# Patient Record
Sex: MALE | Race: BLACK OR AFRICAN AMERICAN
[De-identification: names, ages, dates, MRNs, and addresses within clinical notes are randomized per-mention and may not be internally consistent; named-entity substitution may affect disease eponyms.]

---

## 2022-01-09 ENCOUNTER — HOSPITAL ENCOUNTER (EMERGENCY)
Dept: HOSPITAL 97 - ER | Age: 27
LOS: 1 days | Discharge: HOME | End: 2022-01-10
Payer: SELF-PAY

## 2022-01-09 DIAGNOSIS — Z20.822: ICD-10-CM

## 2022-01-09 DIAGNOSIS — S39.012A: Primary | ICD-10-CM

## 2022-01-09 PROCEDURE — 96361 HYDRATE IV INFUSION ADD-ON: CPT

## 2022-01-09 PROCEDURE — 99284 EMERGENCY DEPT VISIT MOD MDM: CPT

## 2022-01-09 PROCEDURE — 80048 BASIC METABOLIC PNL TOTAL CA: CPT

## 2022-01-09 PROCEDURE — 81003 URINALYSIS AUTO W/O SCOPE: CPT

## 2022-01-09 PROCEDURE — 74177 CT ABD & PELVIS W/CONTRAST: CPT

## 2022-01-09 PROCEDURE — 36415 COLL VENOUS BLD VENIPUNCTURE: CPT

## 2022-01-09 PROCEDURE — 0240U: CPT

## 2022-01-09 PROCEDURE — 96374 THER/PROPH/DIAG INJ IV PUSH: CPT

## 2022-01-09 PROCEDURE — 83690 ASSAY OF LIPASE: CPT

## 2022-01-09 PROCEDURE — 85025 COMPLETE CBC W/AUTO DIFF WBC: CPT

## 2022-01-09 PROCEDURE — 80076 HEPATIC FUNCTION PANEL: CPT

## 2022-01-10 VITALS — OXYGEN SATURATION: 100 % | DIASTOLIC BLOOD PRESSURE: 75 MMHG | SYSTOLIC BLOOD PRESSURE: 124 MMHG | TEMPERATURE: 99.9 F

## 2022-01-10 LAB
ALBUMIN SERPL BCP-MCNC: 3.8 G/DL (ref 3.4–5)
ALP SERPL-CCNC: 79 U/L (ref 45–117)
ALT SERPL W P-5'-P-CCNC: 27 U/L (ref 12–78)
AST SERPL W P-5'-P-CCNC: 15 U/L (ref 15–37)
BUN BLD-MCNC: 16 MG/DL (ref 7–18)
GLUCOSE SERPLBLD-MCNC: 129 MG/DL (ref 74–106)
HCT VFR BLD CALC: 40 % (ref 39.6–49)
LIPASE SERPL-CCNC: 95 U/L (ref 73–393)
LYMPHOCYTES # SPEC AUTO: 1.7 K/UL (ref 0.7–4.9)
PMV BLD: 7.2 FL (ref 7.6–11.3)
POTASSIUM SERPL-SCNC: 3.9 MMOL/L (ref 3.5–5.1)
RBC # BLD: 4.66 M/UL (ref 4.33–5.43)
SARS-COV-2 RNA RESP QL NAA+PROBE: NEGATIVE

## 2022-01-10 NOTE — ER
Nurse's Notes                                                                                     

 [facility]                                                                 

Name: Nick Smyth                                                                                

Age: 26 yrs                                                                                       

Sex: Male                                                                                         

: 1995                                                                                   

MRN: [id number]                                                                                   

Arrival Date: 2022                                                                          

Time: 22:32                                                                                       

Account#: [id number]                                                                            

Bed 10                                                                                            

Private MD:                                                                                       

Diagnosis: Low back pain;Strain of muscle, fascia and tendon of lower back                        

                                                                                                  

Presentation:                                                                                     

                                                                                             

23:28 Chief complaint: Patient states: lower back pain x3 days. denies change in urination.   lg3 

      denies any medications taken at home. reports swelling of the left lower back. pain on      

      laughing or coughing. Coronavirus screen: Client denies travel out of the [place] in the       

      last 14 days. At this time, the client does not indicate any symptoms associated with       

      coronavirus-19. The client reports previous COVID testing was negative. Date of             

      collection: 2022. Ebola Screen: No symptoms or risks identified at this         

      time. Initial Sepsis Screen: Does the patient meet any 2 criteria? No. Patient's            

      initial sepsis screen is negative. Does the patient have a suspected source of              

      infection? No. Patient's initial sepsis screen is negative. Risk Assessment: Do you         

      want to hurt yourself or someone else? Patient reports no desire to harm self or            

      others. Onset of symptoms was 2022.                                             

23:28 Method Of Arrival: Ambulatory                                                           lg3 

23:28 Acuity: NICOLE 3                                                                           lg3 

                                                                                                  

Triage Assessment:                                                                                

23:33 General: Appears uncomfortable, Behavior is calm, cooperative. Pain: Complains of pain  lg3 

      in lumbar area, left low back, left mid back, right mid back and right low back Pain        

      currently is 10 out of 10 on a pain scale. Neuro: Level of Consciousness is awake,          

      alert, obeys commands, Oriented to person, place, time, Appropriate for age Moves all       

      extremities. Gait is steady, Speech is normal. Cardiovascular: Capillary refill < 3         

      seconds Patient's skin is warm and dry. Respiratory: Airway is patent Trachea midline       

      Respiratory effort is even, unlabored, Respiratory pattern is regular, symmetrical. GI:     

      No deficits noted. No signs and/or symptoms were reported involving the                     

      gastrointestinal system. : No deficits noted. No signs and/or symptoms were reported      

      regarding the genitourinary system. Derm: No deficits noted. No signs and/or symptoms       

      reported regarding the dermatologic system. Musculoskeletal: Circulation, motion, and       

      sensation intact. Range of motion: intact in all extremities.                               

                                                                                                  

Historical:                                                                                       

- Allergies:                                                                                      

23:33 No Known Allergies;                                                                     lg3 

- Home Meds:                                                                                      

23:33 None [Active];                                                                          lg3 

- PMHx:                                                                                           

23:33 None;                                                                                   lg3 

- PSHx:                                                                                           

23:33 orthopedic repair ; right knee;                                                         lg3 

                                                                                                  

- Immunization history:: Adult Immunizations up to date, Client reports receiving the             

  2nd dose of the Covid vaccine, pfizer x2.                                                       

- Social history:: Smoking status: Patient denies any tobacco usage or history of.                

                                                                                                  

                                                                                                  

Screenin/10                                                                                             

02:07 Abuse screen: Denies threats or abuse. Denies injuries from another. Nutritional        tw5 

      screening: No deficits noted. Tuberculosis screening: No symptoms or risk factors           

      identified. Fall Risk None identified.                                                      

                                                                                                  

Assessment:                                                                                       

                                                                                             

23:53 General: Reports "The pain has been going on for several days now. Man it hurts!"       tw5 

      Patient states that he had been working out the day prior to the pain, but it has only      

      gotten worse. " I cannot take a deep breath, or move a certain way without it killing       

      me". Pain: Complains of pain in low back area Pain currently is 10 out of 10 on a pain      

      scale. Neuro: Level of Consciousness is awake, alert, obeys commands, Oriented to           

      person, place, time, situation,  are equal bilaterally Gait is steady.                 

01/10                                                                                             

02:06 Reassessment: Patient states feeling better. Patient states symptoms have improved.     tw5 

      Pain: Pain currently is 2 out of 10 on a pain scale.                                        

                                                                                                  

Vital Signs:                                                                                      

                                                                                             

23:28  / 75; Pulse 94; Resp 17; Temp 99.9(O); Pulse Ox 100% on R/A; Weight 70.31 kg     lg3 

      (R); Height 5 ft. 10 in. (177.80 cm) (R); Pain 10/10;                                       

01/10                                                                                             

02:06 Pulse 71; Resp 18; Pulse Ox 100% on R/A; Pain 2/10;                                     tw5 

02:07 Pain 2/10;                                                                              tw5 

                                                                                             

23:28 Body Mass Index 22.24 (70.31 kg, 177.80 cm)                                             lg3 

                                                                                                  

ED Course:                                                                                        

                                                                                             

22:32 Patient arrived in ED.                                                                  bp1 

23:33 Triage completed.                                                                       lg3 

23:33 Arm band placed on.                                                                     lg3 

23:43 Joey Holt NP is PHCP.                                                           pm1 

23:43 Richard Duran MD is Attending Physician.                                             pm1 

23:51 Isabella Wilson is Primary Nurse.                                                         tw5 

01/10                                                                                             

00:25 Basic Metabolic Panel Sent.                                                             tw 

00:25 Basic Metabolic Panel Sent.                                                              

00: CBC with Diff Sent.                                                                     tw 

00: Hepatic Function Sent.                                                                  tw 

00: Lipase Sent.                                                                            tw 

00:25 Inserted saline lock: 20 gauge in right antecubital area, using aseptic technique.      tw5 

      Blood collected.                                                                            

00:25 No provider procedures requiring assistance completed. IV discontinued, intact,         tw5 

      bleeding controlled, No redness/swelling at site. Pressure dressing applied.                

01:22 CT Abd/Pelvis - IV Contrast Only In Process Unspecified.                                EDMS

02:07 Patient has correct armband on for positive identification.                             tw 

                                                                                                  

Administered Medications:                                                                         

                                                                                             

23:51 CANCELLED (Physician Discretion): Ketorolac 60 mg IM once                               pm1 

01/10                                                                                             

00:25 Drug: Ketorolac 30 mg Route: IVP; Site: right antecubital;                              tw 

02:07 Follow up: Pain 2/10 Adult; Response: No adverse reaction; Pain is decreased             

00:25 Drug: NS 0.9% 1000 ml Route: IV; Rate: 1000 ml; Site: right antecubital;                tw 

02:07 Follow up: Response: No adverse reaction; IV Status: Completed infusion; IV Intake:     tw5 

      1000ml                                                                                      

00:31 Drug: Lidoderm Patch 5 % (700 mg/patch) 1 patches Route: Topical; Site: affected area;  tw 

02:07 Follow up: Response: No adverse reaction                                                 

00:31 Drug: Flexeril (cyclobenzaprine) 10 mg Route: PO;                                       tw 

02:07 Follow up: Response: No adverse reaction                                                tw5 

                                                                                                  

                                                                                                  

Intake:                                                                                           

02:07 IV: 1000ml; Total: 1000ml.                                                              tw 

                                                                                                  

Outcome:                                                                                          

00:25 Discharged to home ambulatory.                                                           

00:25 Condition: good                                                                             

00:25 Discharge instructions given to patient, Instructed on discharge instructions, follow       

      up and referral plans. Demonstrated understanding of instructions, follow-up care,          

      medications, Prescriptions given X 3.                                                       

01:50 Discharge ordered by MD.                                                                pm1 

02:08 Patient left the ED.                                                                    tw 

                                                                                                  

Signatures:                                                                                       

Dispatcher [employer]                           EDMS                                                 

Marinas, Joey, NP                    NP   pm1                                                  

Silvana Kramer, RN                       RN   lg3                                                  

Lisha Conte Tiffany                                tw5                                                  

                                                                                                  

**************************************************************************************************

## 2022-01-10 NOTE — EDPHYS
Physician Documentation                                                                           

 [facility]                                                                 

Name: Nick Smyth                                                                                

Age: 26 yrs                                                                                       

Sex: Male                                                                                         

: 1995                                                                                   

MRN: [id number]                                                                                   

Arrival Date: 2022                                                                          

Time: 22:32                                                                                       

Account#: [id number]                                                                            

Bed 10                                                                                            

Private MD:                                                                                       

ED Physician Richard Duran                                                                      

HPI:                                                                                              

                                                                                             

23:51 This 26 yrs old Black Male presents to ER via Ambulatory with complaints of Back Pain,  pm1 

      Fever.                                                                                      

23:51 The patient presents with pain that is acute. The symptoms are located in the left low  pm1 

      back and left mid back. Onset: The symptoms/episode began/occurred 3 day(s) ago. The        

      pain does not radiate. Associated signs and symptoms: Pertinent positives: fever,           

      Pertinent negatives: abdominal pain, chest pain, dysuria, numbness, tingling, vomiting.     

      The problem was sustained possibly from squats at the gym. Modifying factors: The           

      patient symptoms are alleviated by remaining still, the patient symptoms are aggravated     

      by movement. Severity of symptoms: in the emergency department the symptoms are             

      actually worse. The patient has not experienced similar symptoms in the past. The           

      patient has not recently seen a physician.                                                  

                                                                                                  

Historical:                                                                                       

- Allergies:                                                                                      

23:33 No Known Allergies;                                                                     lg3 

- Home Meds:                                                                                      

23:33 None [Active];                                                                          lg3 

- PMHx:                                                                                           

23:33 None;                                                                                   lg3 

- PSHx:                                                                                           

23:33 orthopedic repair ; right knee;                                                         lg3 

                                                                                                  

- Immunization history:: Adult Immunizations up to date, Client reports receiving the             

  2nd dose of the Covid vaccine, pfizer x2.                                                       

- Social history:: Smoking status: Patient denies any tobacco usage or history of.                

                                                                                                  

                                                                                                  

ROS:                                                                                              

23:51 Cardiovascular: Negative for chest pain, palpitations, and edema, Respiratory: Negative pm1 

      for shortness of breath, cough, wheezing, and pleuritic chest pain, Abdomen/GI:             

      Negative for abdominal pain, nausea, vomiting, diarrhea, and constipation.                  

23:51 MS/Extremity: Negative for injury and deformity, Skin: Negative for injury, rash, and       

      discoloration.                                                                              

23:51 Neuro: Negative for headache, weakness, numbness, tingling, and seizure.                    

23:51 Constitutional: Positive for fever, Negative for poor PO intake.                            

23:51 Back: Positive for of the left low back and left mid back, pain.                            

23:51 All other systems are negative.                                                             

                                                                                                  

Exam:                                                                                             

23:51 Constitutional:  This is a well developed, well nourished patient who is awake, alert,  pm1 

      and in no acute distress. Head/Face:  Normocephalic, atraumatic.                            

23:51 Skin:  Warm, dry with normal turgor.  Normal color with no rashes, no lesions, and no       

      evidence of cellulitis.                                                                     

23:51 MS/ Extremity:  Pulses equal, no cyanosis.  Neurovascular intact.  Full, normal range       

      of motion.                                                                                  

23:51 Cardiovascular: Exam negative for  acute changes, Rate: normal, Rhythm: regular,            

      Pulses: no pulse deficits are appreciated.                                                  

23:51 Respiratory: Exam negative for  acute changes, respiratory distress, shortness of           

      breath, Breath sounds: are clear throughout.                                                

23:51 Back: pain, that is moderate, of the  left low back and left mid back, normal spinal        

      alignment noted, muscle spasm, is appreciated in the left mid back.                         

23:51 Neuro: Exam negative for acute changes, Orientation: is normal, Mentation: is normal,       

      Motor: is normal, moves all fours.                                                          

                                                                                                  

Vital Signs:                                                                                      

23:28  / 75; Pulse 94; Resp 17; Temp 99.9(O); Pulse Ox 100% on R/A; Weight 70.31 kg     lg3 

      (R); Height 5 ft. 10 in. (177.80 cm) (R); Pain 10/10;                                       

01/10                                                                                             

02:06 Pulse 71; Resp 18; Pulse Ox 100% on R/A; Pain 2/10;                                     tw5 

02:07 Pain 2/10;                                                                              tw5 

                                                                                             

23:28 Body Mass Index 22.24 (70.31 kg, 177.80 cm)                                             lg3 

                                                                                                  

MDM:                                                                                              

                                                                                             

23:43 Patient medically screened.                                                             pm1 

01/10                                                                                             

01:47 Data reviewed: vital signs. Data interpreted: Pulse oximetry: on room air is 100 %.     pm1 

      Interpretation: normal.                                                                     

01:47 Counseling: I had a detailed discussion with the patient and/or guardian regarding: the pm1 

      historical points, exam findings, and any diagnostic results supporting the                 

      discharge/admit diagnosis, lab results, radiology results, the need for outpatient          

      follow up, to return to the emergency department if symptoms worsen or persist or if        

      there are any questions or concerns that arise at home.                                     

                                                                                                  

                                                                                             

23:50 Order name: Basic Metabolic Panel; Complete Time: 01:                                 pm1 

                                                                                             

23:50 Order name: CBC with Diff; Complete Time: 00:54                                         pm1 

                                                                                             

23:50 Order name: Hepatic Function; Complete Time: 00:54                                      pm1 

                                                                                             

23:50 Order name: Lipase; Complete Time: 00:54                                                pm1 

                                                                                             

23:51 Order name: Basic Metabolic Panel; Complete Time: 00:54                                 EDMS

                                                                                             

23:49 Order name: Urine Dipstick-Ancillary (obtain specimen); Complete Time: 00:26            pm1 

                                                                                             

23:50 Order name: CT Abd/Pelvis - IV Contrast Only                                            pm1 

01/10                                                                                             

00:14 Order name: Urine Dipstick-Ancillary; Complete Time: 00:29                              EDMS

                                                                                             

23:50 Order name: IV Saline Lock; Complete Time: 00:25                                        pm1 

                                                                                             

23:50 Order name: Labs collected and sent; Complete Time: 00:25                               pm1 

                                                                                                  

Administered Medications:                                                                         

                                                                                             

23:51 CANCELLED (Physician Discretion): Ketorolac 60 mg IM once                               pm1 

01/10                                                                                             

00:25 Drug: Ketorolac 30 mg Route: IVP; Site: right antecubital;                              tw5 

02:07 Follow up: Pain 2/10 Adult; Response: No adverse reaction; Pain is decreased            tw5 

00:25 Drug: NS 0.9% 1000 ml Route: IV; Rate: 1000 ml; Site: right antecubital;                tw5 

02:07 Follow up: Response: No adverse reaction; IV Status: Completed infusion; IV Intake:     tw5 

      1000ml                                                                                      

00:31 Drug: Lidoderm Patch 5 % (700 mg/patch) 1 patches Route: Topical; Site: affected area;  tw5 

02:07 Follow up: Response: No adverse reaction                                                tw5 

00:31 Drug: Flexeril (cyclobenzaprine) 10 mg Route: PO;                                       tw5 

02:07 Follow up: Response: No adverse reaction                                                tw5 

                                                                                                  

                                                                                                  

Disposition:                                                                                      

06:39 Co-signature as Attending Physician, Richard Duran MD I agree with the assessment and  zeynep 

      plan of care.                                                                               

                                                                                                  

Disposition Summary:                                                                              

01/10/22 01:50                                                                                    

Discharge Ordered                                                                                 

      Location: Home                                                                          pm1 

      Problem: new                                                                            pm1 

      Symptoms: have improved                                                                 pm1 

      Condition: Stable                                                                       pm1 

      Diagnosis                                                                                   

        - Low back pain                                                                       pm1 

        - Strain of muscle, fascia and tendon of lower back                                   pm1 

      Followup:                                                                               pm1 

        - With: Emergency Department                                                               

        - When: As needed                                                                          

        - Reason: Worsening of condition                                                           

      Followup:                                                                               pm1 

        - With: Private Physician                                                                  

        - When: 2 - 3 days                                                                         

        - Reason: Recheck today's complaints, Continuance of care, Re-evaluation by your           

      physician                                                                                   

      Discharge Instructions:                                                                     

        - Discharge Summary Sheet                                                             pm1 

        - Acute Back Pain, Adult                                                              pm1 

        - Musculoskeletal Pain                                                                pm1 

        - Back Injury Prevention, Easy-to-Read                                                pm1 

      Forms:                                                                                      

        - Medication Reconciliation Form                                                      pm1 

        - Thank You Letter                                                                    pm1 

        - Antibiotic Education                                                                pm1 

        - Prescription Opioid Use                                                             pm1 

      Prescriptions:                                                                              

        - Lidoderm 5 % Topical adhesive patch,medicated                                            

            - apply 1 patch by TRANSDERMAL route once daily As needed 12 hours on and 12      pm1 

      hours off in a 24 hour period; 10 patch; Refills: 0, Product Selection Permitted            

        - Cyclobenzaprine 10 mg Oral Tablet                                                        

            - take 1 tablet by ORAL route every 8 hours As needed; 30 tablet; Refills: 0,     pm1 

      Product Selection Permitted                                                                 

        - Diclofenac Sodium 75 mg Oral tablet,delayed release (DR/EC)                              

            - take 1 tablet by ORAL route 2 times per day As needed; 30 tablet; Refills: 0,   pm1 

      Product Selection Permitted                                                                 

Signatures:                                                                                       

Dispatcher [employer]                           EDRichard Mccall MD MD cha Marinas, Patrick, JOLEEN                    NP   pm1                                                  

Silvana Kramer RN                       RN   lg3                                                  

Isabella Wilson                                tw5                                                  

                                                                                                  

Corrections: (The following items were deleted from the chart)                                    

                                                                                             

23:51 23:49 Ketorolac 60 mg IM once ordered. pm1                                              pm1 

                                                                                                  

**************************************************************************************************

## 2022-01-10 NOTE — RAD REPORT
EXAM DESCRIPTION:  CT - Abdomen   Pelvis W Contrast - 1/10/2022 5:46 am

 

CLINICAL HISTORY:  26 years, Male, FLANK PAIN

 

COMPARISON:  None.

 

TECHNIQUE:  Contrast-enhanced images of the abdomen and pelvis were performed utilizing 5 mm slice th
ickness at 5 mm interval reconstruction from the lung bases to the ischial tuberosities after the adm
inistration IV contrast.

In addition multiplanar reformats in the coronal and sagittal plane were obtained and reviewed.

This exam was performed according to our departmental dose-optimization protocol, which includes auto
mated exposure control, adjustment of the mA and/or kV according to patient size and/or use of iterat
maría reconstruction technique.

 

FINDINGS:  The lung bases demonstrate to be clear.

The liver, gallbladder, pancreas, spleen and adrenal glands demonstrate to be unremarkable, no focal 
lesions are noted.

The kidneys demonstrate normal uptake of contrast media. No evidence for nephrolithiasis and/or hydro
nephrosis.

The lack of intra-abdominal fat limits the evaluation.

Grossly the unopacified stomach, small bowel and large bowel demonstrate to be within normal limits. 
  There is no evidence for bowel dilatation/or free air. There is mild fecal stasis.   The appendix i
s suggested on axial image [id number].

The urinary bladder demonstrate to be unremarkable.   The prostate gland is normal.   The aorta demon
strate to be normal.   There is no retroperitoneal lymphadenopathy. There is no evidence for ascites.
 The rest of the soft tissue and bony structures are within normal limits.

 

IMPRESSION:  No evidence for significant acute intra-abdominal process.

Mild fecal stasis.

Otherwise unremarkable CT scan of the abdomen and pelvis with contrast.

 

Electronically signed by:   Reinaldo Michael MD   1/10/2022 1:36 AM CST Workstation: [id number]

 

 

Due to temporary technical issues with the PACS/Fluency reporting system, reports are being signed by
 the in house radiologist without review as a courtesy to ensure prompt reporting. The interpreting r
adiologist is fully responsible for the content of the report.